# Patient Record
Sex: FEMALE | Race: WHITE | NOT HISPANIC OR LATINO
[De-identification: names, ages, dates, MRNs, and addresses within clinical notes are randomized per-mention and may not be internally consistent; named-entity substitution may affect disease eponyms.]

---

## 2018-02-20 ENCOUNTER — RESULT REVIEW (OUTPATIENT)
Age: 23
End: 2018-02-20

## 2018-04-02 ENCOUNTER — TRANSCRIPTION ENCOUNTER (OUTPATIENT)
Age: 23
End: 2018-04-02

## 2020-08-30 ENCOUNTER — EMERGENCY (EMERGENCY)
Facility: HOSPITAL | Age: 25
LOS: 0 days | Discharge: HOME | End: 2020-08-30
Attending: EMERGENCY MEDICINE | Admitting: EMERGENCY MEDICINE
Payer: MEDICARE

## 2020-08-30 VITALS
SYSTOLIC BLOOD PRESSURE: 142 MMHG | RESPIRATION RATE: 17 BRPM | WEIGHT: 136.03 LBS | HEIGHT: 64 IN | TEMPERATURE: 98 F | DIASTOLIC BLOOD PRESSURE: 82 MMHG | OXYGEN SATURATION: 100 % | HEART RATE: 99 BPM

## 2020-08-30 DIAGNOSIS — R55 SYNCOPE AND COLLAPSE: ICD-10-CM

## 2020-08-30 PROCEDURE — 93010 ELECTROCARDIOGRAM REPORT: CPT

## 2020-08-30 PROCEDURE — 99284 EMERGENCY DEPT VISIT MOD MDM: CPT

## 2020-08-30 NOTE — ED PROVIDER NOTE - ATTENDING CONTRIBUTION TO CARE
24 F to ED with syncopal episode after suddenly standing up from sitting at a baby shower earlier today.  no head trauma, no szr activity, well appearing non toxic.  AVSS, exam as noted, CTAB, RRR, abdomen soft NTND, (+) bowel sounds,

## 2020-08-30 NOTE — ED PROVIDER NOTE - CARE PROVIDER_API CALL
Home C[facility])  Cardiovascular Disease; Interventional Cardiology  [address] [place] [address]  Phone: [phone]  Fax: [phone]  Follow Up Time: Routine

## 2020-08-30 NOTE — ED PROVIDER NOTE - PATIENT PORTAL LINK FT
You can access the FollowMyHealth Patient Portal offered by [facility] by registering at the following website: [email or web address]. By joining [employer]’s FollowMyHealth portal, you will also be able to view your health information using other applications (apps) compatible with our system.

## 2024-12-18 NOTE — ED ADULT NURSE NOTE - ED CARDIAC RHYTHM
Incoming medication refill request from patient for:       Medication: epinephrine 0.3 mg/0.3ml injection   passed protocol.   Last office visit date: 11/21/2024  Next appointment scheduled?: Yes 12/26/2024  Number of refills given: 2 with 3 refills.       11/21/2024 Last office visit  Wt Readings from Last 1 Encounters:   12/13/24 93 kg (205 lb)        BP Readings from Last 2 Encounters:   12/13/24 110/70   11/21/24 118/82   ]    Lab Results   Component Value Date    SODIUM 139 11/14/2024    POTASSIUM 4.1 11/14/2024    CHLORIDE 104 11/14/2024    CO2 23 11/14/2024    BUN 18 11/14/2024    CREATININE 0.60 11/14/2024    GLUCOSE 129 (H) 11/14/2024     No results found for: \"HGBA1C\"  TSH (mcUnits/mL)   Date Value   06/21/2024 0.712     Lab Results   Component Value Date    CHOLESTEROL 298 (H) 10/25/2022    HDL 65 10/25/2022    CALCLDL 188 (H) 10/25/2022    TRIGLYCERIDE 223 (H) 10/25/2022     Lab Results   Component Value Date    AST 48 (H) 11/14/2024     (H) 11/14/2024    ALKPT 66 11/14/2024    BILIRUBIN 0.6 11/14/2024         
regular